# Patient Record
Sex: FEMALE | Race: WHITE | NOT HISPANIC OR LATINO | Employment: UNEMPLOYED | URBAN - METROPOLITAN AREA
[De-identification: names, ages, dates, MRNs, and addresses within clinical notes are randomized per-mention and may not be internally consistent; named-entity substitution may affect disease eponyms.]

---

## 2023-05-13 ENCOUNTER — HOSPITAL ENCOUNTER (EMERGENCY)
Facility: CLINIC | Age: 23
Discharge: HOME | End: 2023-05-13
Attending: EMERGENCY MEDICINE | Admitting: EMERGENCY MEDICINE
Payer: COMMERCIAL

## 2023-05-13 ENCOUNTER — APPOINTMENT (OUTPATIENT)
Dept: GENERAL RADIOLOGY | Facility: CLINIC | Age: 23
End: 2023-05-13
Attending: EMERGENCY MEDICINE
Payer: COMMERCIAL

## 2023-05-13 VITALS
RESPIRATION RATE: 16 BRPM | HEART RATE: 58 BPM | BODY MASS INDEX: 21.34 KG/M2 | SYSTOLIC BLOOD PRESSURE: 110 MMHG | WEIGHT: 125 LBS | OXYGEN SATURATION: 100 % | DIASTOLIC BLOOD PRESSURE: 65 MMHG | HEIGHT: 64 IN | TEMPERATURE: 97 F

## 2023-05-13 DIAGNOSIS — S50.02XA CONTUSION OF LEFT ELBOW, INITIAL ENCOUNTER: ICD-10-CM
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
## 2023-05-13 PROCEDURE — 250N000013 HC RX MED GY IP 250 OP 250 PS 637: Performed by: EMERGENCY MEDICINE

## 2023-05-13 PROCEDURE — 99283 EMERGENCY DEPT VISIT LOW MDM: CPT

## 2023-05-13 PROCEDURE — 73080 X-RAY EXAM OF ELBOW: CPT | Mod: LT

## 2023-05-13 RX ORDER — ACETAMINOPHEN 500 MG
1000 TABLET ORAL ONCE
Status: COMPLETED | OUTPATIENT
Start: 2023-05-13 | End: 2023-05-13

## 2023-05-13 RX ADMIN — ACETAMINOPHEN 1000 MG: 500 TABLET ORAL at 23:01

## 2023-05-13 ASSESSMENT — ENCOUNTER SYMPTOMS: ARTHRALGIAS: 1

## 2023-05-14 NOTE — DISCHARGE INSTRUCTIONS
*Sling as needed for comfort.  Rest, ice and elevation.  *Ibuprofen and/or Tylenol as directed as needed for pain.  *Follow-up with orthopedics in 1 to 2 weeks if you have ongoing symptoms.  *Return if you become worse in any way.

## 2023-05-14 NOTE — ED PROVIDER NOTES
"  History     Chief Complaint:  Arm Injury (Pt. Tripped on stairs striking left elbow. Now, having left elbow discomfort. ? Deformity. )       The history is provided by the patient.      Nicole Hollingsworth is a 22 year old female that is right handed who presents with left elbow pain. The patient tripped while going on the stairs, falling onto her left elbow and is now experiencing pain in her elbow and tingling in her hand over the ulnar side of the hand and the 4th and 5th fingers. She has been icing her elbow and hand since the incident. She denies any head injury.    Independent Historian:   None - Patient Only    Review of External Notes: None  ROS:  Review of Systems   Musculoskeletal: Positive for arthralgias.   Neurological:        (+) tingling   (-) head injury    All other systems reviewed and are negative.    Allergies:  The patient has no known allergies      Medications:    The patient does not take any routine medication     Past Medical History:    The patient has no known past medical history     Social History:  Patient came from home.  Patient is accompanied in the ED by father.   PCP: No primary care provider on file.     Physical Exam     Patient Vitals for the past 24 hrs:   BP Temp Temp src Pulse Resp SpO2 Height Weight   05/13/23 2224 110/65 97  F (36.1  C) Temporal 58 16 100 % 1.626 m (5' 4\") 56.7 kg (125 lb)        Physical Exam  General: Well-nourished, no acute distress  Eyes: PERRL, conjunctivae pink no scleral icterus or conjunctival injection  ENT:  Moist mucus membranes  Respiratory:  No respiratory distress  CV: Normal rate.  Normal distal capillary refill in the left hand.  Normal radial pulse in the left hand.  Skin: Warm, dry.  No rashes or petechiae.  No signs of frostbite.  Musculoskeletal: Tenderness over the left elbow.  Compartments soft.  Able to flex and extend at the elbow with pain.  Able to pronate and supinate.  No tenderness over the remainder of the humerus, " shoulder, forearm, wrist or hand.  No midline tenderness over the cervical or thoracic spine.  Neuro: Alert and oriented to person/place/time.  Reports decreased sensation or tingling sensation in the left fourth and fifth fingers in the ulnar aspect of the hand but is able to feel to light touch.  Arm is quite cold and she reports she has been icing since the accident.  Psychiatric: Normal affect    Emergency Department Course     Imaging:  Elbow  XR, G/E 3 views, left   Final Result   IMPRESSION: Normal joint spaces and alignment. No fracture or joint effusion.         Report per radiology    Emergency Department Course & Assessments:       Interventions:  Medications   acetaminophen (TYLENOL) tablet 1,000 mg (1,000 mg Oral $Given 5/13/23 1646)      Assessments:  2248 I consulted with the patient and obtained history as shown above    Independent Interpretation (X-rays, CTs, rhythm strip):  I reviewed and interpreted the left elbow x-ray.  I see no evidence of fracture or dislocation with special attention to the location of the patient's pain and tenderness.      Consultations/Discussion of Management or Tests:  None     Social Determinants of Health affecting care:   None    Disposition:  The patient was discharged to home.     Impression & Plan    CMS Diagnoses: None    Medical Decision Making:  Nicole Hollingsworth is a 22 year old female with pain after she fell onto her left elbow.  X-ray does not reveal any definite fractures.  He reports some tingling in the ulnar aspect of her arm and hand but she is been icing quite a while and this may be secondary to the icing.  Is also possible that she has an injury to the ulnar nerve from hitting her elbow so hard.  She does have sensation intact and discussed with her that she should probably stop icing for the meantime.  Discussed the possibility of an occult fracture.  We placed her in a sling and if she is not improving we like her to follow-up with orthopedics  in the next week.  No signs of frostbite.  No signs of a compartment syndrome.  At this time, with reasonable clinical confidence, I do believe she safe for discharge home.    Diagnosis:    ICD-10-CM    1. Contusion of left elbow, initial encounter  S50.02XA       2. Fall, initial encounter  W19.XXXA            Discharge Medications:  There are no discharge medications for this patient.     Scribe Disclosure:  I, Param Ojeda, am serving as a scribe at 11:04 PM on 5/13/2023 to document services personally performed by Elaine Solitario MD found based on my observations and the provider's statements to me.     5/13/2023   Elaine Solitario MD Cho, Amy C, MD  05/14/23 0105

## 2023-05-21 ENCOUNTER — HEALTH MAINTENANCE LETTER (OUTPATIENT)
Age: 23
End: 2023-05-21

## 2024-07-28 ENCOUNTER — HEALTH MAINTENANCE LETTER (OUTPATIENT)
Age: 24
End: 2024-07-28

## 2025-08-10 ENCOUNTER — HEALTH MAINTENANCE LETTER (OUTPATIENT)
Age: 25
End: 2025-08-10